# Patient Record
Sex: MALE | Race: WHITE
[De-identification: names, ages, dates, MRNs, and addresses within clinical notes are randomized per-mention and may not be internally consistent; named-entity substitution may affect disease eponyms.]

---

## 2019-07-02 ENCOUNTER — HOSPITAL ENCOUNTER (EMERGENCY)
Dept: HOSPITAL 26 - MED | Age: 24
Discharge: HOME | End: 2019-07-02
Payer: COMMERCIAL

## 2019-07-02 VITALS — HEIGHT: 69 IN | BODY MASS INDEX: 25.48 KG/M2 | WEIGHT: 172 LBS

## 2019-07-02 VITALS — DIASTOLIC BLOOD PRESSURE: 86 MMHG | SYSTOLIC BLOOD PRESSURE: 129 MMHG

## 2019-07-02 VITALS — DIASTOLIC BLOOD PRESSURE: 84 MMHG | SYSTOLIC BLOOD PRESSURE: 125 MMHG

## 2019-07-02 DIAGNOSIS — M54.9: ICD-10-CM

## 2019-07-02 DIAGNOSIS — F41.9: ICD-10-CM

## 2019-07-02 DIAGNOSIS — J02.9: Primary | ICD-10-CM

## 2019-07-02 DIAGNOSIS — F14.90: ICD-10-CM

## 2019-07-02 LAB
BARBITURATES UR QL SCN: NEGATIVE NG/ML
BENZODIAZ UR QL SCN: NEGATIVE NG/ML
BZE UR QL SCN: POSITIVE NG/ML
CANNABINOIDS UR QL SCN: NEGATIVE NG/ML
OPIATES UR QL SCN: NEGATIVE NG/ML
PCP UR QL SCN: NEGATIVE NG/ML

## 2019-07-02 PROCEDURE — 99284 EMERGENCY DEPT VISIT MOD MDM: CPT

## 2019-07-02 PROCEDURE — 80305 DRUG TEST PRSMV DIR OPT OBS: CPT

## 2019-07-02 NOTE — NUR
Patient discharged with v/s stable. Written and verbal after care instructions 
given and explained. 

Patient alert, oriented and verbalized understanding of instructions. 
Ambulatory with steady gait. All questions addressed prior to discharge. ID 
band removed. Patient advised to follow up with PMD. Rx of Azithromycin, Motrin 
given. Patient educated on indication of medication including possible reaction 
and side effects. Opportunity to ask questions provided and answered.

## 2019-07-02 NOTE — NUR
BIB SELF. AAO X 4 C/O DIZZINESS SINCE FRIDAY OFF/ON.  PT DENIES NO 
NAUSEA/VOMITING. FEVER ON SATURDAY.PERRLA BRISK 3 MM. EQUAL KYLEE STRENGTH TO 
UPPER AND LOWER EXTREMITIES. STEADY GAIT. MD ER TO EVALUATE PT.

## 2019-09-16 ENCOUNTER — HOSPITAL ENCOUNTER (EMERGENCY)
Dept: HOSPITAL 26 - MED | Age: 24
Discharge: HOME | End: 2019-09-16
Payer: COMMERCIAL

## 2019-09-16 VITALS — BODY MASS INDEX: 25.18 KG/M2 | WEIGHT: 170 LBS | HEIGHT: 69 IN

## 2019-09-16 VITALS — SYSTOLIC BLOOD PRESSURE: 132 MMHG | DIASTOLIC BLOOD PRESSURE: 70 MMHG

## 2019-09-16 DIAGNOSIS — H72.92: Primary | ICD-10-CM

## 2019-09-16 DIAGNOSIS — F12.90: ICD-10-CM

## 2019-09-16 NOTE — NUR
Patient discharged with v/s stable. Written and verbal after care instructions 
given and explained. 

Patient alert, oriented and verbalized understanding of instructions. 
Ambulatory with to home. All questions addressed prior to discharge. ID band 
removed. Patient advised to follow up with PMD. Rx of AUGMENTIN AND IBURPROFEN 
given. Patient educated on indication of medication including possible reaction 
and side effects. Opportunity to ask questions provided and answered.

## 2019-09-16 NOTE — NUR
PT C/O LT EAR PAIN STARTING AT 1600 TODAY. WOKE UP FROM [facility] WITH DROPS OF BLOOD 
ON PILLOW. PT FELT POPPING SENSATION IN LT EAR BEFORE BLEEDING. PT STATES HE 
PUT A Q-TIP IN THE EAR PRIOR TO BLEEDING. EAR PAIN 6/10 AT THIS TIME.  PT TOOK 
NORCO AT 2000 TODAY FOR PAIN. RESPIRATIONS EVEN AND UNLABORED. IN BED WITH 
MOTHER AT BEDSIDE, CALM PLEASANT. 



MEDHX: DENIES 

ALLERGIES: DENIES